# Patient Record
Sex: MALE | Race: OTHER | Employment: FULL TIME | URBAN - METROPOLITAN AREA
[De-identification: names, ages, dates, MRNs, and addresses within clinical notes are randomized per-mention and may not be internally consistent; named-entity substitution may affect disease eponyms.]

---

## 2022-05-02 ENCOUNTER — HOSPITAL ENCOUNTER (OUTPATIENT)
Age: 22
Discharge: HOME | End: 2022-05-02

## 2022-05-02 ENCOUNTER — APPOINTMENT (OUTPATIENT)
Dept: GENERAL RADIOLOGY | Age: 22
End: 2022-05-02
Attending: NURSE PRACTITIONER

## 2022-05-02 VITALS
TEMPERATURE: 99 F | SYSTOLIC BLOOD PRESSURE: 152 MMHG | WEIGHT: 162 LBS | DIASTOLIC BLOOD PRESSURE: 60 MMHG | HEART RATE: 68 BPM | BODY MASS INDEX: 23.99 KG/M2 | OXYGEN SATURATION: 100 % | HEIGHT: 69 IN | RESPIRATION RATE: 16 BRPM

## 2022-05-02 DIAGNOSIS — S42.002A CLOSED NONDISPLACED FRACTURE OF LEFT CLAVICLE, UNSPECIFIED PART OF CLAVICLE, INITIAL ENCOUNTER: Primary | ICD-10-CM

## 2022-05-02 PROCEDURE — 73000 X-RAY EXAM OF COLLAR BONE: CPT | Performed by: NURSE PRACTITIONER

## 2022-05-02 PROCEDURE — A4565 SLINGS: HCPCS | Performed by: NURSE PRACTITIONER

## 2022-05-02 PROCEDURE — 99203 OFFICE O/P NEW LOW 30 MIN: CPT | Performed by: NURSE PRACTITIONER

## 2022-05-02 RX ORDER — IBUPROFEN 600 MG/1
600 TABLET ORAL EVERY 8 HOURS PRN
Qty: 30 TABLET | Refills: 0 | Status: SHIPPED | OUTPATIENT
Start: 2022-05-02 | End: 2022-05-09

## 2022-05-02 NOTE — ED INITIAL ASSESSMENT (HPI)
Patient presents to IC after falling off his horse yesterday with c/o left shoulder, neck and collarbone pain. States he landed on left side. Left side of hip with bruising. Denies head trauma or LOC.

## 2022-05-03 ENCOUNTER — OFFICE VISIT (OUTPATIENT)
Dept: ORTHOPEDICS CLINIC | Facility: CLINIC | Age: 22
End: 2022-05-03

## 2022-05-03 ENCOUNTER — TELEPHONE (OUTPATIENT)
Dept: ORTHOPEDICS CLINIC | Facility: CLINIC | Age: 22
End: 2022-05-03

## 2022-05-03 DIAGNOSIS — S42.025A NONDISPLACED FRACTURE OF SHAFT OF LEFT CLAVICLE, INITIAL ENCOUNTER FOR CLOSED FRACTURE: Primary | ICD-10-CM

## 2022-05-03 PROCEDURE — 99243 OFF/OP CNSLTJ NEW/EST LOW 30: CPT | Performed by: ORTHOPAEDIC SURGERY

## 2022-05-03 PROCEDURE — 23500 CLTX CLAVICULAR FX W/O MNPJ: CPT | Performed by: ORTHOPAEDIC SURGERY

## 2022-05-03 NOTE — TELEPHONE ENCOUNTER
New pt calling asking to be seen today has a fractured clavicle please advise      Self pay aware of $256.50 due @ checkin

## 2022-05-20 ENCOUNTER — HOSPITAL ENCOUNTER (OUTPATIENT)
Dept: GENERAL RADIOLOGY | Facility: HOSPITAL | Age: 22
Discharge: HOME | End: 2022-05-20
Attending: ORTHOPAEDIC SURGERY

## 2022-05-20 ENCOUNTER — OFFICE VISIT (OUTPATIENT)
Dept: ORTHOPEDICS CLINIC | Facility: CLINIC | Age: 22
End: 2022-05-20

## 2022-05-20 DIAGNOSIS — Z47.89 ORTHOPEDIC AFTERCARE: ICD-10-CM

## 2022-05-20 DIAGNOSIS — Z47.89 ORTHOPEDIC AFTERCARE: Primary | ICD-10-CM

## 2022-05-20 DIAGNOSIS — S42.018D CLOSED NONDISPLACED FRACTURE OF STERNAL END OF LEFT CLAVICLE WITH ROUTINE HEALING, SUBSEQUENT ENCOUNTER: ICD-10-CM

## 2022-05-20 PROCEDURE — 99024 POSTOP FOLLOW-UP VISIT: CPT | Performed by: ORTHOPAEDIC SURGERY

## 2022-05-20 PROCEDURE — 73000 X-RAY EXAM OF COLLAR BONE: CPT | Performed by: ORTHOPAEDIC SURGERY

## 2022-05-20 NOTE — PROGRESS NOTES
NURSING INTAKE COMMENTS: Patient presents with:  Fracture: Previous patient of Dr Gagandeep Garibay, Left clavicle fracture. Patient is in an arm sling. Patient denies any pain, swelling, numbness or tingling. HPI: This 20year old male presents today with complaints of left shoulder injury follow-up. Its been nearly 3 weeks since time of injury. He suffered a fracture of his medial clavicle. This occurred after a fall from a horse. He is been wearing a sling. He has been treated nonoperatively. He has no significant pain currently. He takes the sling off periodically at home for gentle range of motion. No mechanical clicking or locking. No numbness or tingling. History reviewed. No pertinent past medical history. History reviewed. No pertinent surgical history. No current outpatient medications on file. No Known Allergies  History reviewed. No pertinent family history.     Social History    Occupational History      Not on file    Tobacco Use      Smoking status: Never Smoker      Smokeless tobacco: Never Used    Substance and Sexual Activity      Alcohol use: Never      Drug use: Never      Sexual activity: Not on file       Review of Systems:  GENERAL: denies fevers, chills, night sweats, fatigue, unintentional weight loss/gain  SKIN: denies skin lesions, open sores, rash  HEENT:denies recent vision change, new nasal congestion,hearing loss, tinnitus, sore throat, headaches  RESPIRATORY: denies new shortness of breath, cough, asthma, wheezing  CARDIOVASCULAR: denies chest pain, leg cramps with exertion, palpitations, leg swelling  GI: denies abdominal pain, nausea, vomiting, diarrhea, constipation, hematochezia, worsening heartburn or stomach ulcers  : denies dysuria, hematuria, incontinence, increased frequency, urgency, difficulty urinating  MUSCULOSKELETAL: denies musculoskeletal complaints other than in HPI  NEURO: denies numbness, tingling, weakness, balance issues, dizziness, memory loss  PSYCHIATRIC: denies Hx of depression, anxiety, other psychiatric disorders  HEMATOLOGIC: denies blood clots, anemia, blood clotting disorders, blood transfusion  ENDOCRINE: denies autoimmune disease, thyroid issues, or diabetes  ALLERGY: denies asthma, seasonal allergies    Physical Examination:    There were no vitals taken for this visit. Constitutional: appears well hydrated, alert and responsive, no acute distress noted  Extremities: Left shoulder contour is slightly protracted with mild inferior displacement of glenohumeral joint. Passive forward flexion of the shoulder is to 90 degrees with no pain. No tenderness over the mid clavicle. Mild palpable lump or deformity at the fracture site. Neurological: Left hand light touch and pinprick sensory exam normal. Lateral shoulder light touch and pinprick sensory examination normal.  strength,wrist extension, biceps, triceps, and shoulder abduction strength 5 out of 5. Sebastián sign negative. No obvious atrophy of the hand. Imaging:   XR CLAVICLE, COMPLETE, LEFT (CPT=73000)    Result Date: 5/2/2022  PROCEDURE: XR CLAVICLE, COMPLETE, LEFT (CPT=73000)  COMPARISON: None. INDICATIONS: Pain to left shoulder and clavicle post injury falling off horse x 1 day. TECHNIQUE:       Two views were obtained. FINDINGS:  BONES:                   There is a mildly angulated fracture at the junction of the proximal and middle 1/3 of the left clavicle. No additional bony abnormality noted. SOFT TISSUES:      Negative. No visible soft tissue swelling. CONCLUSION:  1. Mildly angulated fracture of proximal clavicle. Dictated by (CST): Torri Pires MD on 5/02/2022 at 2:49 PM     Finalized by (CST): Torri Pires MD on 5/02/2022 at 2:50 PM             X-rays left clavicle were obtained in the office today. They demonstrate a medial third clavicular shaft fracture without displacement.   There is apex superior angulation of approximately 30 to 40 degrees. No obvious callus formation as of yet. Labs:  No results found for: WBC, HGB, PLT   No results found for: GLU, BUN, CREATSERUM, GFR, GFRNAA, GFRAA     Assessment and Plan:  Diagnoses and all orders for this visit:    Orthopedic aftercare  -     XR CLAVICLE, COMPLETE, LEFT (CPT=73000); Future    Closed nondisplaced fracture of sternal end of left clavicle with routine healing, subsequent encounter        Assessment: Healing left clavicle fracture    Plan: I advised continued nonoperative care. Suggested continued use of the sling for an additional week to 2 weeks. Avoid weightbearing on the extremity. Begin active range of motion of the shoulder as tolerated. Follow-up x-rays in 2 weeks. Follow Up: Return in about 2 weeks (around 6/3/2022).     Travis Blas MD

## 2022-06-03 ENCOUNTER — HOSPITAL ENCOUNTER (OUTPATIENT)
Dept: GENERAL RADIOLOGY | Facility: HOSPITAL | Age: 22
Discharge: HOME | End: 2022-06-03
Attending: ORTHOPAEDIC SURGERY

## 2022-06-03 ENCOUNTER — OFFICE VISIT (OUTPATIENT)
Dept: ORTHOPEDICS CLINIC | Facility: CLINIC | Age: 22
End: 2022-06-03

## 2022-06-03 DIAGNOSIS — S42.018D CLOSED NONDISPLACED FRACTURE OF STERNAL END OF LEFT CLAVICLE WITH ROUTINE HEALING, SUBSEQUENT ENCOUNTER: Primary | ICD-10-CM

## 2022-06-03 DIAGNOSIS — Z47.89 ORTHOPEDIC AFTERCARE: ICD-10-CM

## 2022-06-03 PROCEDURE — 73000 X-RAY EXAM OF COLLAR BONE: CPT | Performed by: ORTHOPAEDIC SURGERY

## 2022-06-03 NOTE — PROGRESS NOTES
NURSING INTAKE COMMENTS: Patient presents with:  Fracture: Left clavicle fracture, denies any pain, numbness or tingling. HPI: This 20year old male presents today with complaints of left clavicle fracture follow-up. He is now 5 weeks post injury. He reports no significant pain in the area. He has been doing light movements with the arm but is wearing the sling for the most part. 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 History reviewed. No pertinent past medical history. History reviewed. No pertinent surgical history. No current outpatient medications on file. No Known Allergies  History reviewed. No pertinent family history.     Social History    Occupational History      Not on file    Tobacco Use      Smoking status: Never Smoker      Smokeless tobacco: Never Used    Substance and Sexual Activity      Alcohol use: Never      Drug use: Never      Sexual activity: Not on file       Review of Systems:  GENERAL: denies fevers, chills, night sweats, fatigue, unintentional weight loss/gain  SKIN: denies skin lesions, open sores, rash  HEENT:denies recent vision change, new nasal congestion,hearing loss, tinnitus, sore throat, headaches  RESPIRATORY: denies new shortness of breath, cough, asthma, wheezing  CARDIOVASCULAR: denies chest pain, leg cramps with exertion, palpitations, leg swelling  GI: denies abdominal pain, nausea, vomiting, diarrhea, constipation, hematochezia, worsening heartburn or stomach ulcers  : denies dysuria, hematuria, incontinence, increased frequency, urgency, difficulty urinating  MUSCULOSKELETAL: denies musculoskeletal complaints other than in HPI  NEURO: denies numbness, tingling, weakness, balance issues, dizziness, memory loss  PSYCHIATRIC: denies Hx of depression, anxiety, other psychiatric disorders  HEMATOLOGIC: denies blood clots, anemia, blood clotting disorders, blood transfusion  ENDOCRINE: denies autoimmune disease, thyroid issues, or diabetes  ALLERGY: denies asthma, seasonal allergies    Physical Examination:    There were no vitals taken for this visit. Constitutional: appears well hydrated, alert and responsive, no acute distress noted  Extremities: Left clavicle moderate prominence in the mid third superiorly. No tenderness in the area. Left shoulder full active range of motion with 5 out of 5 strength in external/internal rotation noted  Neurological: Unchanged    Imaging:   XR CLAVICLE, COMPLETE, LEFT (CPT=73000)    Result Date: 5/20/2022  PROCEDURE: XR CLAVICLE, COMPLETE, LEFT (CPT=73000)  COMPARISON: [facility], XR CLAVICLE, COMPLETE, LEFT (CPT=73000), 5/02/2022, 2:42 PM.  INDICATIONS: Follow up left clavicle pain post-fall 5/1/2022. No new injuries. TECHNIQUE: 2 views were obtained. FINDINGS:  BONES: Minimally displaced left clavicle fracture at the junction between the medial and mid 3rd with apex minimally pointing cephalad (mild inferior angulation). No significant change. OTHER: Negative. CONCLUSION:  1. Healing left clavicle fracture without significant change. Dictated by (CST): Josefina Lux MD on 5/20/2022 at 12:00 PM     Finalized by (CST): Josefina Lux MD on 5/20/2022 at 12:03 PM        X-rays of the left clavicle were obtained in the office today. They demonstrate good maintenance alignment of the fracture site. There is apex superior angulatory deformity. There is some callus formation consistent with healing. Labs:  No results found for: WBC, HGB, PLT   No results found for: GLU, BUN, CREATSERUM, GFR, GFRNAA, GFRAA     Assessment and Plan:  Diagnoses and all orders for this visit:    Closed nondisplaced fracture of sternal end of left clavicle with routine healing, subsequent encounter    Orthopedic aftercare  -     XR CLAVICLE, COMPLETE, LEFT (CPT=73000);  Future        Assessment: Healing left clavicle fracture    Plan: I recommend discontinuation of the sling. Avoid heavy lifting pushing and pulling for an additional 2 weeks. Return for x-rays of the clavicle in 3 weeks. He may continue to work with the restriction no lifting pushing or pulling more than 20 pounds. Follow Up: Return in about 3 weeks (around 6/24/2022).     Kendal Ferguson MD

## 2022-06-24 ENCOUNTER — OFFICE VISIT (OUTPATIENT)
Dept: ORTHOPEDICS CLINIC | Facility: CLINIC | Age: 22
End: 2022-06-24

## 2022-06-24 ENCOUNTER — HOSPITAL ENCOUNTER (OUTPATIENT)
Dept: GENERAL RADIOLOGY | Facility: HOSPITAL | Age: 22
Discharge: HOME | End: 2022-06-24
Attending: ORTHOPAEDIC SURGERY

## 2022-06-24 DIAGNOSIS — Z47.89 ORTHOPEDIC AFTERCARE: ICD-10-CM

## 2022-06-24 DIAGNOSIS — S42.018D CLOSED NONDISPLACED FRACTURE OF STERNAL END OF LEFT CLAVICLE WITH ROUTINE HEALING, SUBSEQUENT ENCOUNTER: Primary | ICD-10-CM

## 2022-06-24 PROCEDURE — 99024 POSTOP FOLLOW-UP VISIT: CPT | Performed by: ORTHOPAEDIC SURGERY

## 2022-06-24 PROCEDURE — 73000 X-RAY EXAM OF COLLAR BONE: CPT | Performed by: ORTHOPAEDIC SURGERY

## 2022-06-24 NOTE — PROGRESS NOTES
NURSING INTAKE COMMENTS: Patient presents with: Follow - Up: Left Clacicle fracture. Patient denies any pain at this time. HPI: This 20year old male presents today with complaints of left clavicle injury follow-up. He 7 weeks post injury. He reports no significant pain in the shoulder. He is avoided certain activities. He is not yet returned to horseback riding. History reviewed. No pertinent past medical history. History reviewed. No pertinent surgical history. No current outpatient medications on file. No Known Allergies  History reviewed. No pertinent family history.     Social History    Occupational History      Not on file    Tobacco Use      Smoking status: Never Smoker      Smokeless tobacco: Never Used    Substance and Sexual Activity      Alcohol use: Never      Drug use: Never      Sexual activity: Not on file       Review of Systems:  GENERAL: denies fevers, chills, night sweats, fatigue, unintentional weight loss/gain  SKIN: denies skin lesions, open sores, rash  HEENT:denies recent vision change, new nasal congestion,hearing loss, tinnitus, sore throat, headaches  RESPIRATORY: denies new shortness of breath, cough, asthma, wheezing  CARDIOVASCULAR: denies chest pain, leg cramps with exertion, palpitations, leg swelling  GI: denies abdominal pain, nausea, vomiting, diarrhea, constipation, hematochezia, worsening heartburn or stomach ulcers  : denies dysuria, hematuria, incontinence, increased frequency, urgency, difficulty urinating  MUSCULOSKELETAL: denies musculoskeletal complaints other than in HPI  NEURO: denies numbness, tingling, weakness, balance issues, dizziness, memory loss  PSYCHIATRIC: denies Hx of depression, anxiety, other psychiatric disorders  HEMATOLOGIC: denies blood clots, anemia, blood clotting disorders, blood transfusion  ENDOCRINE: denies autoimmune disease, thyroid issues, or diabetes  ALLERGY: denies asthma, seasonal allergies    Physical Examination: There were no vitals taken for this visit. Constitutional: appears well hydrated, alert and responsive, no acute distress noted  Extremities: Left shoulder full active range of motion all planes. Abduction external rotation motor strength 5-5. Cross chest adduction testing produces no pain. Minimal tenderness over the medial third of the clavicle. Neurological: Left hand light touch and pinprick sensory exam normal. Lateral shoulder light touch and pinprick sensory examination normal.  strength,wrist extension, biceps, triceps, and shoulder abduction strength 5 out of 5. Sebastián sign negative. No obvious atrophy of the hand. Imaging:   XR CLAVICLE, COMPLETE, LEFT (CPT=73000)    Result Date: 6/3/2022  PROCEDURE: XR CLAVICLE, COMPLETE, LEFT (CPT=73000)  COMPARISON: [facility], [facility] 2nd Floor, XR CLAVICLE, COMPLETE, LEFT (CPT=73000), 5/20/2022, 8:34 AM.  [facility], XR CLAVICLE, COMPLETE, LEFT (CPT=73000), 5/02/2022, 2:42 PM.  INDICATIONS: Follow up left clavicle pain post-fall 5/1/2022. No new injuries. TECHNIQUE: 2 views were obtained. FINDINGS:  BONES: Again visualized is a fracture of the proximal shaft of the left clavicle with apex superior angulation. There has been increase of callus formation and bony bridging. The fracture line is still clearly visible. SOFT TISSUES: Negative. No visible soft tissue swelling. EFFUSION: None visible. OTHER: Negative. CONCLUSION:   Healing proximal calculi shaft fracture with apex superior angulation. Dictated by (CST): Denilson Anderson MD on 6/03/2022 at 11:35 AM     Finalized by (CST): Denilson Anderson MD on 6/03/2022 at 11:36 AM             X-rays left clavicle were obtained in the office today. There is no change in alignment. There is abundant callus formation consistent with healing.   There is persistent lucency at the fracture site consistent with incomplete bony union    Labs:  No results found for: WBC, HGB, PLT   No results found for: GLU, BUN, CREATSERUM, GFR, GFRNAA, GFRAA     Assessment and Plan:  Diagnoses and all orders for this visit:    Closed nondisplaced fracture of sternal end of left clavicle with routine healing, subsequent encounter    Orthopedic aftercare  -     XR CLAVICLE, COMPLETE, LEFT (CPT=73000); Future        Assessment: Healing left clavicle fracture    Plan: I advised gradual return to activity over the next 3 to 4 weeks. Avoid contact sports and high risk activities for at least 3-4 additional weeks. Follow-up x-rays in 6 weeks. Follow Up: Return in about 6 weeks (around 8/5/2022).     Jose Resendiz MD

## 2022-07-28 ENCOUNTER — TELEPHONE (OUTPATIENT)
Dept: ORTHOPEDICS CLINIC | Facility: CLINIC | Age: 22
End: 2022-07-28

## 2022-08-04 NOTE — TELEPHONE ENCOUNTER
Pt returned the call to the nurse. Pt aware appointment tomorrow is canceled. Please call pt to reschedule.

## (undated) NOTE — LETTER
Date & Time: 5/2/2022, 3:21 PM  Patient: Aditya Shaffer  Encounter Provider(s):    NORIS Zhao       To Whom It May Concern:    Aditya Shaffer was seen and treated in our department on 5/2/2022. He should not return to work until 3/2/22. No use of the R arm.     If you have any questions or concerns, please do not hesitate to call.        _____________________________  Physician/APC Signature

## (undated) NOTE — LETTER
Date & Time: 5/2/2022, 3:24 PM  Patient: Pau Alex  Encounter Provider(s):    NORIS Wayne       To Whom It May Concern:    Pau Alex was seen and treated in our department on 5/2/2022. He should not return to work until 5/3/22. No use of the left arm until patient follows up with orthopedics.  .    If you have any questions or concerns, please do not hesitate to call.        _____________________________  Physician/APC Signature

## (undated) NOTE — LETTER
5/3/2022          To Whom It May Concern:    Vikki Crespo is currently under my medical care and may not return to work at this time. Please excuse [employer] for 1 week and then he may return to work light duty. He may return to work on 5/10/22 with the following restrictions:  No use of the left arm. We will see him back in the office in 2 weeks, at which time we will re evaluate. If you require additional information please contact our office.         Sincerely,    Marie Madison MD